# Patient Record
Sex: FEMALE | Race: WHITE
[De-identification: names, ages, dates, MRNs, and addresses within clinical notes are randomized per-mention and may not be internally consistent; named-entity substitution may affect disease eponyms.]

---

## 2017-07-29 ENCOUNTER — HOSPITAL ENCOUNTER (EMERGENCY)
Dept: HOSPITAL 38 - CC.ED | Age: 37
Discharge: HOME | End: 2017-07-29
Payer: SELF-PAY

## 2017-07-29 VITALS — SYSTOLIC BLOOD PRESSURE: 127 MMHG | DIASTOLIC BLOOD PRESSURE: 82 MMHG

## 2017-07-29 DIAGNOSIS — L03.314: Primary | ICD-10-CM

## 2017-07-29 DIAGNOSIS — Z88.0: ICD-10-CM

## 2017-07-29 DIAGNOSIS — Z79.899: ICD-10-CM

## 2017-07-29 DIAGNOSIS — Z88.5: ICD-10-CM

## 2017-07-29 DIAGNOSIS — F17.210: ICD-10-CM

## 2017-07-29 LAB
CHLORIDE SERPL-SCNC: 104 MEQ/L (ref 98–106)
SODIUM SERPL-SCNC: 140 MEQ/L (ref 136–145)

## 2017-07-29 PROCEDURE — 96375 TX/PRO/DX INJ NEW DRUG ADDON: CPT

## 2017-07-29 PROCEDURE — 83605 ASSAY OF LACTIC ACID: CPT

## 2017-07-29 PROCEDURE — 36415 COLL VENOUS BLD VENIPUNCTURE: CPT

## 2017-07-29 PROCEDURE — 80053 COMPREHEN METABOLIC PANEL: CPT

## 2017-07-29 PROCEDURE — 85025 COMPLETE CBC W/AUTO DIFF WBC: CPT

## 2017-07-29 PROCEDURE — 96374 THER/PROPH/DIAG INJ IV PUSH: CPT

## 2017-07-29 PROCEDURE — 87040 BLOOD CULTURE FOR BACTERIA: CPT

## 2017-07-29 PROCEDURE — 99284 EMERGENCY DEPT VISIT MOD MDM: CPT

## 2017-07-29 PROCEDURE — 96361 HYDRATE IV INFUSION ADD-ON: CPT

## 2017-07-29 PROCEDURE — 74177 CT ABD & PELVIS W/CONTRAST: CPT

## 2017-07-29 PROCEDURE — 84703 CHORIONIC GONADOTROPIN ASSAY: CPT

## 2017-07-29 NOTE — EDM.PDOC
ED HPI GENERAL MEDICAL PROBLEM





- General


Chief Complaint: Skin Complaint


Stated Complaint: abcess


Time Seen by Provider: 07/29/17 12:47


Source of Information: Reports: Patient


History Limitations: Reports: No Limitations





- History of Present Illness


INITIAL COMMENTS - FREE TEXT/NARRATIVE: 





This patient is a 36 year old female that presents to the ER. Patient reports 

that for the past 3 days she has had an abscess to the middle/right groin area. 

She reports that she was seen yesterday, attempted draining, but no success. 

She reports today the redness around the area has now become a little worse and 

now has some mild swelling and pain to her right vaginal area. Patient reports 

she got abx injectino yesterday and was prescribed clindamycin that she just 

too for the first time just before coming to the ER. Patient denies headache, 

dizziness, n, v, d, f, cp, soa, abd pain, back pain, urinary/bowel changes, 

rashes. 


Duration: Day(s): (3)


Location: Reports: Other (groin)


Severity: Moderate


Improves with: Reports: None


Worsens with: Reports: None


Associated Symptoms: Denies: Confusion, Chest Pain, Cough, cough w sputum, 

Diaphoresis, Fever/Chills, Headaches, Loss of Appetite, Malaise, Nausea/Vomiting

, Rash, Seizure, Shortness of Breath, Syncope, Weakness


  ** Right Groin


Pain Score (Numeric/FACES): 6





- Related Data


 Allergies











Allergy/AdvReac Type Severity Reaction Status Date / Time


 


morphine Allergy  Vomiting Verified 07/29/17 12:15


 


Penicillins Allergy  Vomiting Verified 07/29/17 12:15











Home Meds: 


 Home Meds





LORazepam [LORazepam] 0.5 mg PO BID PRN 07/07/17 [History]


Omeprazole 20 mg PO DAILY PRN 07/07/17 [History]


Clindamycin HCl 300 mg PO QID 07/29/17 [History]











Past Medical History


Gastrointestinal History: Reports: Other (See Below)


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 

from normal


Genitourinary History: Reports: Other (See Below)


Other Genitourinary History: hx hosp for "kidney infection"





- Past Surgical History


Dermatological Surgical History: Reports: Other (See Below)





Social & Family History





- Tobacco Use


Smoking Status *Q: Current Every Day Smoker


Years of Tobacco use: 5


Packs/Tins Daily: 1





- Recreational Drug Use


Recreational Drug Use: No





ED ROS GENERAL





- Review of Systems


Review Of Systems: See Below


Constitutional: Reports: No Symptoms


HEENT: Reports: No Symptoms


Respiratory: Reports: No Symptoms


Cardiovascular: Reports: No Symptoms


Endocrine: Reports: No Symptoms


GI/Abdominal: Reports: No Symptoms


: Reports: Other (right vaginal area painful, swelling mild. )


Musculoskeletal: Reports: No Symptoms


Skin: Reports: Lumps (large asbcess to right middle grion area. redness, 

swelling. )


Neurological: Reports: No Symptoms


Psychiatric: Reports: No Symptoms


Hematologic/Lymphatic: Reports: No Symptoms


Immunologic: Reports: No Symptoms





ED EXAM, SKIN/RASH


Exam: See Below


Exam Limited By: No Limitations


General Appearance: Alert, WD/WN, No Apparent Distress


Eye Exam: Bilateral Eye: Normal Inspection, PERRL


Ears: Normal External Exam, Normal Canal, Hearing Grossly Normal, Normal TMs


Nose: Normal Inspection, Normal Mucosa, No Blood


Throat/Mouth: Normal Inspection, Normal Lips, Normal Teeth, Normal Gums, Normal 

Oropharynx, Normal Voice, No Airway Compromise


Head: Atraumatic, Normocephalic


Neck: Normal Inspection, Supple, Non-Tender, Full Range of Motion


Respiratory/Chest: No Respiratory Distress, Lungs Clear, Normal Breath Sounds, 

No Accessory Muscle Use


Cardiovascular: Normal Peripheral Pulses, Regular Rate, Rhythm (96 on exam. ), 

No Edema, No Gallop, No JVD, No Murmur, No Rub


Peripheral Pulses: 2+: Radial (L), Radial (R)


GI/Abdominal: Normal Bowel Sounds, Soft, Non-Tender, No Organomegaly, No 

Distention, No Abnormal Bruit, No Mass, Pelvis Stable


 (Female) Exam: Other (RN PRESENT; Right labia majora, mild swelling. No 

redness at this site. No heat. No area of fluctulance to drain. No obvious 

cellulitis at this site. )


Back Exam: Normal Inspection, Full Range of Motion


Extremities: Normal Inspection, Normal Range of Motion, Non-Tender, No Pedal 

Edema, Normal Capillary Refill


Neurological: Alert, Oriented, No Motor/Sensory Deficits


Psychiatric: Normal Affect, Normal Mood


Skin: Warm, Dry, Intact, No Rash, Erythema (large swelling, errythema, area 

consistent with abscess to the right/middle groin area. No streaking, not 

fluctulant. Hard to touch. Mildly warm. measures 8cmx 4.5cm.)





Course





- Vital Signs


Last Recorded V/S: 


 Last Vital Signs











Temp  98.5 F   07/29/17 12:17


 


Pulse  109 H  07/29/17 12:17


 


Resp  16   07/29/17 12:17


 


BP  127/82   07/29/17 12:17


 


Pulse Ox  97   07/29/17 12:17














- Orders/Labs/Meds


Orders: 


 Active Orders 24 hr











 Category Date Time Status


 


 Abdomen Pelvis w Cont [CT] Stat Exams  07/29/17 12:48 Taken


 


 CULTURE BLOOD [BC] Stat Lab  07/29/17 12:55 Received


 


 CULTURE BLOOD [BC] Stat Lab  07/29/17 13:00 Received


 


 Blood Culture x2 Reflex Set [OM.PC] Stat Oth  07/29/17 12:48 Ordered











Labs: 


 Laboratory Tests











  07/29/17 07/29/17 07/29/17 Range/Units





  12:55 12:55 12:55 


 


WBC  14.7 H    (5.0-10.0)  10^3/uL


 


RBC  3.99 L    (4.00-5.50)  10^6/uL


 


Hgb  12.2    (12.0-16.0)  g/dL


 


Hct  37.5    (37.0-47.0)  %


 


MCV  94.0    (82.0-94.0)  fL


 


MCH  30.6    (27.0-32.0)  pg


 


MCHC  32.5 L    (33.0-38.0)  g/dL


 


RDW Coeff of Grady  13.5    (11.0-15.0)  %


 


Plt Count  334    (150-400)  10^3/uL


 


Neut % (Auto)  73.1    (35-85)  %


 


Lymph % (Auto)  12.3    (10-55)  %


 


Mono % (Auto)  8.9    (0-16)  %


 


Eos % (Auto)  5.5 H    (0-5)  %


 


Baso % (Auto)  0.2    (0-3)  %


 


Neut # (Auto)  10.73 H    (1.80-7.00)  10^3/uL


 


Lymph # (Auto)  1.80    (1.00-4.80)  10^3/uL


 


Mono # (Auto)  1.31 H    (0.00-0.80)  10^3/uL


 


Eos # (Auto)  0.80 H    (0.00-0.45)  10^3/uL


 


Baso # (Auto)  0.03    10^3/uL


 


Sodium   140   (136-145)  mEq/L


 


Potassium   3.7   (3.5-5.0)  mEq/L


 


Chloride   104   ()  mEq/L


 


Carbon Dioxide   28   (21-32)  mmol/L


 


BUN   11   (7-18)  mg/dL


 


Creatinine   0.8   (0.6-1.0)  mg/dL


 


Est Cr Clr Drug Dosing   71.17   mL/min


 


Estimated GFR (MDRD)   > 60   (>=60)  mL/min


 


Glucose   89   (75-99)  mg/dL


 


Lactic Acid    0.6  (0.4-2.0)  mmol/L


 


Calcium   8.6   (8.4-10.1)  mg/dL


 


Total Bilirubin   0.4   (0.0-1.0)  mg/dL


 


AST   10 L   (15-37)  U/L


 


ALT   17   (12-78)  U/L


 


Alkaline Phosphatase   59   ()  U/L


 


Total Protein   6.5   (6.4-8.2)  g/dL


 


Albumin   3.4   (3.4-5.0)  g/dL


 


HCG, Qual     














  07/29/17 Range/Units





  12:55 


 


WBC   (5.0-10.0)  10^3/uL


 


RBC   (4.00-5.50)  10^6/uL


 


Hgb   (12.0-16.0)  g/dL


 


Hct   (37.0-47.0)  %


 


MCV   (82.0-94.0)  fL


 


MCH   (27.0-32.0)  pg


 


MCHC   (33.0-38.0)  g/dL


 


RDW Coeff of Grady   (11.0-15.0)  %


 


Plt Count   (150-400)  10^3/uL


 


Neut % (Auto)   (35-85)  %


 


Lymph % (Auto)   (10-55)  %


 


Mono % (Auto)   (0-16)  %


 


Eos % (Auto)   (0-5)  %


 


Baso % (Auto)   (0-3)  %


 


Neut # (Auto)   (1.80-7.00)  10^3/uL


 


Lymph # (Auto)   (1.00-4.80)  10^3/uL


 


Mono # (Auto)   (0.00-0.80)  10^3/uL


 


Eos # (Auto)   (0.00-0.45)  10^3/uL


 


Baso # (Auto)   10^3/uL


 


Sodium   (136-145)  mEq/L


 


Potassium   (3.5-5.0)  mEq/L


 


Chloride   ()  mEq/L


 


Carbon Dioxide   (21-32)  mmol/L


 


BUN   (7-18)  mg/dL


 


Creatinine   (0.6-1.0)  mg/dL


 


Est Cr Clr Drug Dosing   mL/min


 


Estimated GFR (MDRD)   (>=60)  mL/min


 


Glucose   (75-99)  mg/dL


 


Lactic Acid   (0.4-2.0)  mmol/L


 


Calcium   (8.4-10.1)  mg/dL


 


Total Bilirubin   (0.0-1.0)  mg/dL


 


AST   (15-37)  U/L


 


ALT   (12-78)  U/L


 


Alkaline Phosphatase   ()  U/L


 


Total Protein   (6.4-8.2)  g/dL


 


Albumin   (3.4-5.0)  g/dL


 


HCG, Qual  Negative  











Meds: 


Medications














Discontinued Medications














Generic Name Dose Route Start Last Admin





  Trade Name Freq  PRN Reason Stop Dose Admin


 


Ceftriaxone Sodium  1 gm  07/29/17 12:50  07/29/17 13:17





  Rocephin  IVPUSH  07/29/17 12:51  1 gm





  ONETIME ONE   Administration


 


Hydromorphone HCl  0.5 mg  07/29/17 13:35  07/29/17 13:41





  Dilaudid  IVPUSH  07/29/17 13:36  0.5 mg





  ONETIME ONE   Administration


 


Sodium Chloride  1,000 mls @ 1,000 mls/hr  07/29/17 12:56  07/29/17 13:17





  Normal Saline  IV  07/29/17 13:55  1,000 mls/hr





  .BOLUS ONE   Administration


 


Iopamidol  100 ml  07/29/17 12:58  07/29/17 13:13





  Isovue-300 (61%)  IVPUSH  07/29/17 12:59  100 ml





  ONETIME ONE   Administration


 


Ondansetron HCl  4 mg  07/29/17 13:35  07/29/17 13:41





  Zofran  IVPUSH  07/29/17 13:36  4 mg





  NOW STA   Administration














- Radiology Interpretation


Free Text/Narrative:: 





CT Abd/Pelvis with contrast: Discussed with radiologist: No fluid collection. 

Soft tissue skin inflammation of the right lower pelvic region. Fibroid uterus. 

No other findings. 





Departure





- Departure


Time of Disposition: 14:02


Disposition: Home, Self-Care 01


Condition: Good


Clinical Impression: 


Cellulitis


Qualifiers:


 Site of cellulitis: trunk Site of cellulitis of trunk: groin Qualified Code(s)

: L03.314 - Cellulitis of groin








- Discharge Information


Instructions:  Cellulitis, Adult


Referrals: 


Cliff Restrepo PA-C [Primary Care Provider] - 


Forms:  ED Department Discharge


Additional Instructions: 


Followup with your primary care provider on Monday for recheck


Return to the ER for worsening of condition or any emergent concerns such as 

fever, vomiting.


Increase fluids


Take your Clindamycin as prescribed. 





- My Orders


Last 24 Hours: 


My Active Orders





07/29/17 12:48


Abdomen Pelvis w Cont [CT] Stat 


Blood Culture x2 Reflex Set [OM.PC] Stat 





07/29/17 12:55


CULTURE BLOOD [BC] Stat 





07/29/17 13:00


CULTURE BLOOD [BC] Stat 














- Assessment/Plan


Last 24 Hours: 


My Active Orders





07/29/17 12:48


Abdomen Pelvis w Cont [CT] Stat 


Blood Culture x2 Reflex Set [OM.PC] Stat 





07/29/17 12:55


CULTURE BLOOD [BC] Stat 





07/29/17 13:00


CULTURE BLOOD [BC] Stat 











Plan: 





PLEASE SEE RN NOTE FOR PFSH.

## 2021-02-26 ENCOUNTER — HOSPITAL ENCOUNTER (EMERGENCY)
Dept: HOSPITAL 38 - CC.ED | Age: 41
Discharge: HOME | End: 2021-02-26
Payer: SELF-PAY

## 2021-02-26 VITALS — DIASTOLIC BLOOD PRESSURE: 91 MMHG | SYSTOLIC BLOOD PRESSURE: 133 MMHG | HEART RATE: 106 BPM

## 2021-02-26 DIAGNOSIS — Z79.899: ICD-10-CM

## 2021-02-26 DIAGNOSIS — Z72.0: ICD-10-CM

## 2021-02-26 DIAGNOSIS — F41.0: Primary | ICD-10-CM

## 2021-02-26 DIAGNOSIS — Z88.5: ICD-10-CM

## 2021-02-26 DIAGNOSIS — Z88.0: ICD-10-CM

## 2021-02-26 DIAGNOSIS — K21.9: ICD-10-CM

## 2021-02-26 LAB
APTT PPP: 22.8 SEC (ref 23.2–32.3)
CHLORIDE SERPL-SCNC: 103 MEQ/L (ref 98–106)
SODIUM SERPL-SCNC: 139 MEQ/L (ref 136–145)

## 2021-02-26 PROCEDURE — 85730 THROMBOPLASTIN TIME PARTIAL: CPT

## 2021-02-26 PROCEDURE — 84484 ASSAY OF TROPONIN QUANT: CPT

## 2021-02-26 PROCEDURE — 83690 ASSAY OF LIPASE: CPT

## 2021-02-26 PROCEDURE — 99284 EMERGENCY DEPT VISIT MOD MDM: CPT

## 2021-02-26 PROCEDURE — 85610 PROTHROMBIN TIME: CPT

## 2021-02-26 PROCEDURE — 71046 X-RAY EXAM CHEST 2 VIEWS: CPT

## 2021-02-26 PROCEDURE — 36415 COLL VENOUS BLD VENIPUNCTURE: CPT

## 2021-02-26 PROCEDURE — 82550 ASSAY OF CK (CPK): CPT

## 2021-02-26 PROCEDURE — 80053 COMPREHEN METABOLIC PANEL: CPT

## 2021-02-26 PROCEDURE — 93005 ELECTROCARDIOGRAM TRACING: CPT

## 2021-02-26 PROCEDURE — 83615 LACTATE (LD) (LDH) ENZYME: CPT

## 2021-02-26 PROCEDURE — 85025 COMPLETE CBC W/AUTO DIFF WBC: CPT
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
